# Patient Record
Sex: MALE | Race: WHITE | NOT HISPANIC OR LATINO | Employment: OTHER | URBAN - METROPOLITAN AREA
[De-identification: names, ages, dates, MRNs, and addresses within clinical notes are randomized per-mention and may not be internally consistent; named-entity substitution may affect disease eponyms.]

---

## 2017-08-01 ENCOUNTER — TRANSCRIBE ORDERS (OUTPATIENT)
Dept: ADMINISTRATIVE | Facility: HOSPITAL | Age: 42
End: 2017-08-01

## 2017-08-01 ENCOUNTER — APPOINTMENT (OUTPATIENT)
Dept: LAB | Facility: HOSPITAL | Age: 42
End: 2017-08-01
Payer: MEDICARE

## 2017-08-01 DIAGNOSIS — A69.29 LYME CARDITIS: ICD-10-CM

## 2017-08-01 DIAGNOSIS — A69.29 LYME CARDITIS: Primary | ICD-10-CM

## 2017-08-01 PROCEDURE — 86618 LYME DISEASE ANTIBODY: CPT

## 2017-08-01 PROCEDURE — 36415 COLL VENOUS BLD VENIPUNCTURE: CPT

## 2017-08-02 LAB
B BURGDOR IGG SER IA-ACNC: 0.06
B BURGDOR IGM SER IA-ACNC: 0.21

## 2018-09-26 ENCOUNTER — APPOINTMENT (EMERGENCY)
Dept: ULTRASOUND IMAGING | Facility: HOSPITAL | Age: 43
End: 2018-09-26
Payer: MEDICARE

## 2018-09-26 ENCOUNTER — HOSPITAL ENCOUNTER (EMERGENCY)
Facility: HOSPITAL | Age: 43
Discharge: HOME | End: 2018-09-26
Attending: EMERGENCY MEDICINE
Payer: MEDICARE

## 2018-09-26 VITALS
BODY MASS INDEX: 35 KG/M2 | SYSTOLIC BLOOD PRESSURE: 154 MMHG | TEMPERATURE: 97.9 F | HEART RATE: 86 BPM | DIASTOLIC BLOOD PRESSURE: 84 MMHG | HEIGHT: 71 IN | OXYGEN SATURATION: 98 % | RESPIRATION RATE: 20 BRPM | WEIGHT: 250 LBS

## 2018-09-26 DIAGNOSIS — L03.115 CELLULITIS OF RIGHT LOWER EXTREMITY: Primary | ICD-10-CM

## 2018-09-26 LAB
ALBUMIN SERPL BCP-MCNC: 3.5 G/DL (ref 3.5–5)
ALP SERPL-CCNC: 62 U/L (ref 46–116)
ALT SERPL W P-5'-P-CCNC: 80 U/L (ref 12–78)
ANION GAP SERPL CALCULATED.3IONS-SCNC: 4 MMOL/L (ref 4–13)
APTT PPP: 26 SECONDS (ref 24–36)
AST SERPL W P-5'-P-CCNC: 38 U/L (ref 5–45)
BASOPHILS # BLD AUTO: 0.08 THOUSANDS/ΜL (ref 0–0.1)
BASOPHILS NFR BLD AUTO: 1 % (ref 0–1)
BILIRUB SERPL-MCNC: 0.3 MG/DL (ref 0.2–1)
BUN SERPL-MCNC: 10 MG/DL (ref 5–25)
CALCIUM SERPL-MCNC: 8.7 MG/DL (ref 8.3–10.1)
CHLORIDE SERPL-SCNC: 105 MMOL/L (ref 100–108)
CO2 SERPL-SCNC: 34 MMOL/L (ref 21–32)
CREAT SERPL-MCNC: 1.04 MG/DL (ref 0.6–1.3)
EOSINOPHIL # BLD AUTO: 0.18 THOUSAND/ΜL (ref 0–0.61)
EOSINOPHIL NFR BLD AUTO: 2 % (ref 0–6)
ERYTHROCYTE [DISTWIDTH] IN BLOOD BY AUTOMATED COUNT: 12.8 % (ref 11.6–15.1)
GFR SERPL CREATININE-BSD FRML MDRD: 86 ML/MIN/1.73SQ M
GLUCOSE SERPL-MCNC: 90 MG/DL (ref 65–140)
HCT VFR BLD AUTO: 47.2 % (ref 36.5–49.3)
HGB BLD-MCNC: 16.1 G/DL (ref 12–17)
IMM GRANULOCYTES # BLD AUTO: 0.11 THOUSAND/UL (ref 0–0.2)
IMM GRANULOCYTES NFR BLD AUTO: 1 % (ref 0–2)
INR PPP: 0.9 (ref 0.86–1.17)
LYMPHOCYTES # BLD AUTO: 1.59 THOUSANDS/ΜL (ref 0.6–4.47)
LYMPHOCYTES NFR BLD AUTO: 18 % (ref 14–44)
MCH RBC QN AUTO: 30.8 PG (ref 26.8–34.3)
MCHC RBC AUTO-ENTMCNC: 34.1 G/DL (ref 31.4–37.4)
MCV RBC AUTO: 90 FL (ref 82–98)
MONOCYTES # BLD AUTO: 1.31 THOUSAND/ΜL (ref 0.17–1.22)
MONOCYTES NFR BLD AUTO: 15 % (ref 4–12)
NEUTROPHILS # BLD AUTO: 5.57 THOUSANDS/ΜL (ref 1.85–7.62)
NEUTS SEG NFR BLD AUTO: 63 % (ref 43–75)
NRBC BLD AUTO-RTO: 0 /100 WBCS
PLATELET # BLD AUTO: 247 THOUSANDS/UL (ref 149–390)
PMV BLD AUTO: 10.4 FL (ref 8.9–12.7)
POTASSIUM SERPL-SCNC: 4.3 MMOL/L (ref 3.5–5.3)
PROT SERPL-MCNC: 6.6 G/DL (ref 6.4–8.2)
PROTHROMBIN TIME: 12 SECONDS (ref 11.8–14.2)
RBC # BLD AUTO: 5.22 MILLION/UL (ref 3.88–5.62)
SODIUM SERPL-SCNC: 143 MMOL/L (ref 136–145)
WBC # BLD AUTO: 8.84 THOUSAND/UL (ref 4.31–10.16)

## 2018-09-26 PROCEDURE — 93971 EXTREMITY STUDY: CPT

## 2018-09-26 PROCEDURE — 99284 EMERGENCY DEPT VISIT MOD MDM: CPT

## 2018-09-26 PROCEDURE — 85025 COMPLETE CBC W/AUTO DIFF WBC: CPT | Performed by: PHYSICIAN ASSISTANT

## 2018-09-26 PROCEDURE — 80053 COMPREHEN METABOLIC PANEL: CPT | Performed by: PHYSICIAN ASSISTANT

## 2018-09-26 PROCEDURE — 36415 COLL VENOUS BLD VENIPUNCTURE: CPT | Performed by: PHYSICIAN ASSISTANT

## 2018-09-26 PROCEDURE — 85610 PROTHROMBIN TIME: CPT | Performed by: PHYSICIAN ASSISTANT

## 2018-09-26 PROCEDURE — 96365 THER/PROPH/DIAG IV INF INIT: CPT

## 2018-09-26 PROCEDURE — 93971 EXTREMITY STUDY: CPT | Performed by: SURGERY

## 2018-09-26 PROCEDURE — 85730 THROMBOPLASTIN TIME PARTIAL: CPT | Performed by: PHYSICIAN ASSISTANT

## 2018-09-26 RX ORDER — CLINDAMYCIN PHOSPHATE 600 MG/50ML
600 INJECTION INTRAVENOUS ONCE
Status: COMPLETED | OUTPATIENT
Start: 2018-09-26 | End: 2018-09-26

## 2018-09-26 RX ORDER — CLINDAMYCIN HYDROCHLORIDE 300 MG/1
300 CAPSULE ORAL 3 TIMES DAILY
Qty: 30 CAPSULE | Refills: 0 | Status: SHIPPED | OUTPATIENT
Start: 2018-09-26 | End: 2018-10-06

## 2018-09-26 RX ORDER — CLINDAMYCIN PHOSPHATE 600 MG/50ML
INJECTION INTRAVENOUS
Status: DISCONTINUED
Start: 2018-09-26 | End: 2018-09-26 | Stop reason: HOSPADM

## 2018-09-26 RX ORDER — HYDROCODONE BITARTRATE AND ACETAMINOPHEN 5; 325 MG/1; MG/1
1 TABLET ORAL EVERY 6 HOURS PRN
Qty: 12 TABLET | Refills: 0 | Status: SHIPPED | OUTPATIENT
Start: 2018-09-26 | End: 2018-09-29

## 2018-09-26 RX ADMIN — CLINDAMYCIN PHOSPHATE 600 MG: 600 INJECTION, SOLUTION INTRAVENOUS at 15:25

## 2018-09-26 NOTE — DISCHARGE INSTRUCTIONS
Cellulitis   WHAT YOU NEED TO KNOW:   Cellulitis is a skin infection caused by bacteria  Cellulitis may go away on its own or you may need treatment  Your healthcare provider may draw a [place] around the outside edges of your cellulitis  If your cellulitis spreads, your healthcare provider will see it outside of the [place]  DISCHARGE INSTRUCTIONS:   Call 911 if:   · You have sudden trouble breathing or chest pain  Return to the emergency department if:   · Your wound gets larger and more painful  · You feel a crackling under your skin when you touch it  · You have purple dots or bumps on your skin, or you see bleeding under your skin  · You have new swelling and pain in your legs  · The red, warm, swollen area gets larger  · You see red streaks coming from the infected area  Contact your healthcare provider if:   · You have a fever  · Your fever or pain does not go away or gets worse  · The area does not get smaller after 2 days of antibiotics  · Your skin is flaking or peeling off  · You have questions or concerns about your condition or care  Medicines:   · Antibiotics  help treat the bacterial infection  · NSAIDs , such as ibuprofen, help decrease swelling, pain, and fever  NSAIDs can cause stomach bleeding or kidney problems in certain people  If you take blood thinner medicine, always ask if NSAIDs are safe for you  Always read the medicine label and follow directions  Do not give these medicines to children under 10months of age without direction from your child's healthcare provider  · Acetaminophen  decreases pain and fever  It is available without a doctor's order  Ask how much to take and how often to take it  Follow directions  Read the labels of all other medicines you are using to see if they also contain acetaminophen, or ask your doctor or pharmacist  Acetaminophen can cause liver damage if not taken correctly   Do not use more than 4 grams (4,000 milligrams) total of acetaminophen in one day  · Take your medicine as directed  Contact your healthcare provider if you think your medicine is not helping or if you have side effects  Tell him or her if you are allergic to any medicine  Keep a list of the medicines, vitamins, and herbs you take  Include the amounts, and when and why you take them  Bring the list or the pill bottles to follow-up visits  Carry your medicine list with you in case of an emergency  Self-care:   · Elevate the area above the level of your heart  as often as you can  This will help decrease swelling and pain  Prop the area on pillows or blankets to keep it elevated comfortably  · Clean the area daily until the wound scabs over  Gently wash the area with soap and water  Pat dry  Use dressings as directed  · Place cool or warm, wet cloths on the area as directed  Use clean cloths and clean water  Leave it on the area until the cloth is room temperature  Pat the area dry with a clean, dry cloth  The cloths may help decrease pain  Prevent cellulitis:   · Do not scratch bug bites or areas of injury  You increase your risk for cellulitis by scratching these areas  · Do not share personal items, such as towels, clothing, and razors  · Clean exercise equipment  with germ-killing  before and after you use it  · Wash your hands often  Use soap and water  Wash your hands after you use the bathroom, change a child's diapers, or sneeze  Wash your hands before you prepare or eat food  Use lotion to prevent dry, cracked skin  · Wear pressure stockings as directed  You may be told to wear the stockings if you have peripheral edema  The stockings improve blood flow and decrease swelling  · Treat athlete's foot  This can help prevent the spread of a bacterial skin infection  Follow up with your healthcare provider within 3 days, or as directed:   Your healthcare provider will check if your cellulitis is getting better  You may need different medicine  Write down your questions so you remember to ask them during your visits  © 2017 [employer] Information is for End User's use only and may not be sold, redistributed or otherwise used for commercial purposes  All illustrations and images included in CareNotes® are the copyrighted property of [employer]  or [employer]  The above information is an  only  It is not intended as medical advice for individual conditions or treatments  Talk to your doctor, nurse or pharmacist before following any medical regimen to see if it is safe and effective for you

## 2018-09-26 NOTE — ED PROVIDER NOTES
History  Chief Complaint   Patient presents with    Leg Swelling     Pt presents to ER with c/o's RLE redness & swelling that he noticed today  Pt denies trauma  Healthy appearing adult presents in no distress  24-year-old male with past medical history significant for hypertension presents to the emergency department with chief complaint of right lower extremity redness and swelling  Onset of symptoms reported as today  Location of symptoms reported as right lower leg  Quality is reported as localized redness and swelling  Severity is reported as moderate  Associated symptoms:  Denies fevers  Denies chills  Denies nausea or vomiting  Denies lower extremity paralysis paresthesias or weakness  Positive for redness and erythema  Modifying factors:  Nothing has been tried for treatment of symptoms  Context:  Patient reports no local injury or trauma to the area  He reports today he noticed increasing redness and swelling to his right lower leg  He denies any prior similar episodes in the past   He denies any history of diabetes  He does report that he was on a plane ride for  a few hours down to [place] earlier this week followed by 4 hours of driving  He reports no history of DVT in the past   He reports he is on testosterone replacement for low testosterone levels  He is a nonsmoker  Denies history of MRSA  Medical summary: reviewed past visits via [facility], no prior visits to this ED  History provided by:  Patient   used: No        None       Past Medical History:   Diagnosis Date    ADD (attention deficit disorder)     Chronic pain     Hypertension        History reviewed  No pertinent surgical history  History reviewed  No pertinent family history  I have reviewed and agree with the history as documented      Social History   Substance Use Topics    Smoking status: Not on file    Smokeless tobacco: Not on file    Alcohol use Not on file        Review of Systems   Constitutional: Negative for activity change, appetite change, chills, diaphoresis, fatigue, fever and unexpected weight change  HENT: Negative for congestion, dental problem, drooling, ear discharge, ear pain, facial swelling, hearing loss, mouth sores, nosebleeds, postnasal drip, rhinorrhea, sinus pain, sinus pressure, sneezing, sore throat, tinnitus, trouble swallowing and voice change  Eyes: Negative for photophobia, pain, discharge, redness, itching and visual disturbance  Respiratory: Negative for apnea, cough, choking, chest tightness, shortness of breath, wheezing and stridor  Cardiovascular: Negative for chest pain, palpitations and leg swelling  Gastrointestinal: Negative for abdominal distention, abdominal pain, anal bleeding, blood in stool, constipation, diarrhea, nausea, rectal pain and vomiting  Endocrine: Negative for cold intolerance, heat intolerance, polydipsia, polyphagia and polyuria  Genitourinary: Negative for difficulty urinating, dysuria, flank pain, frequency, hematuria and urgency  Musculoskeletal: Negative for arthralgias, back pain, gait problem, joint swelling, myalgias, neck pain and neck stiffness  Skin: Positive for rash  Negative for pallor and wound  Allergic/Immunologic: Negative for environmental allergies, food allergies and immunocompromised state  Neurological: Negative for dizziness, tremors, seizures, syncope, facial asymmetry, speech difficulty, weakness, light-headedness, numbness and headaches  Hematological: Negative for adenopathy  Does not bruise/bleed easily  Psychiatric/Behavioral: Negative for agitation, confusion and hallucinations  The patient is not nervous/anxious  All other systems reviewed and are negative  Physical Exam  Physical Exam   Constitutional: He is oriented to person, place, and time  He appears well-developed and well-nourished  No distress     /84 (BP Location: Right arm)   Pulse 86   Temp 97 9 °F (36 6 °C) (Oral)   Resp 20   Ht 5' 11" (1 803 m)   Wt 113 kg (250 lb)   SpO2 98%   BMI 34 87 kg/m²    HENT:   Head: Normocephalic and atraumatic  Right Ear: External ear normal    Left Ear: External ear normal    Nose: Nose normal    Mouth/Throat: Oropharynx is clear and moist  No oropharyngeal exudate  Eyes: Conjunctivae and EOM are normal  Pupils are equal, round, and reactive to light  Right eye exhibits no discharge  Left eye exhibits no discharge  No scleral icterus  Neck: Normal range of motion  Neck supple  No tracheal deviation present  No thyromegaly present  Cardiovascular: Normal rate, regular rhythm and intact distal pulses  Pulmonary/Chest: Effort normal and breath sounds normal  No stridor  No respiratory distress  He has no wheezes  He has no rales  He exhibits no tenderness  Abdominal: Soft  Bowel sounds are normal  He exhibits no distension and no mass  There is no tenderness  There is no rebound and no guarding  Musculoskeletal: Normal range of motion  He exhibits edema and tenderness  He exhibits no deformity  Right knee: He exhibits erythema  He exhibits normal range of motion, no effusion, no ecchymosis, no deformity, no laceration, normal alignment, no LCL laxity, normal patellar mobility, no bony tenderness and no MCL laxity  Legs:  Lymphadenopathy:     He has no cervical adenopathy  Neurological: He is alert and oriented to person, place, and time  He displays normal reflexes  No cranial nerve deficit or sensory deficit  He exhibits normal muscle tone  Coordination normal    Skin: Skin is warm and dry  Capillary refill takes less than 2 seconds  No rash noted  He is not diaphoretic  There is erythema  No pallor  There is a roughly circular shaped area 5 cm diameter of flat uniform erythema present to anterior right lower leg  No definable abrasion/laceration/skin breakdown  This area is tender to palpation       Psychiatric: He has a normal mood and affect  His behavior is normal  Judgment and thought content normal    Nursing note and vitals reviewed  Vital Signs  ED Triage Vitals [09/26/18 1230]   Temperature Pulse Respirations Blood Pressure SpO2   97 9 °F (36 6 °C) 86 20 154/84 98 %      Temp Source Heart Rate Source Patient Position - Orthostatic VS BP Location FiO2 (%)   Oral Monitor Sitting Right arm --      Pain Score       7           Vitals:    09/26/18 1230   BP: 154/84   Pulse: 86   Patient Position - Orthostatic VS: Sitting       Visual Acuity      ED Medications  Medications   clindamycin (CLEOCIN) IVPB (premix) 600 mg (0 mg Intravenous Stopped 9/26/18 1605)       Diagnostic Studies  Results Reviewed     Procedure Component Value Units Date/Time    Protime-INR [[id number]]  (Normal) Collected:  09/26/18 1326    Lab Status:  Final result Specimen:  Blood from Arm, Right Updated:  09/26/18 1420     Protime 12 0 seconds      INR 0 90    APTT [[id number]]  (Normal) Collected:  09/26/18 1326    Lab Status:  Final result Specimen:  Blood from Arm, Right Updated:  09/26/18 1420     PTT 26 seconds     Comprehensive metabolic panel [[id number]]  (Abnormal) Collected:  09/26/18 1326    Lab Status:  Final result Specimen:  Blood from Arm, Right Updated:  09/26/18 1354     Sodium 143 mmol/L      Potassium 4 3 mmol/L      Chloride 105 mmol/L      CO2 34 (H) mmol/L      ANION GAP 4 mmol/L      BUN 10 mg/dL      Creatinine 1 04 mg/dL      Glucose 90 mg/dL      Calcium 8 7 mg/dL      AST 38 U/L      ALT 80 (H) U/L      Alkaline Phosphatase 62 U/L      Total Protein 6 6 g/dL      Albumin 3 5 g/dL      Total Bilirubin 0 30 mg/dL      eGFR 86 ml/min/1 73sq m     Narrative:         National Kidney Disease Education Program recommendations are as follows:  GFR calculation is accurate only with a steady state creatinine  Chronic Kidney disease less than 60 ml/min/1 73 sq  meters  Kidney failure less than 15 ml/min/1 73 sq  meters      CBC and differential [[id number]] (Abnormal) Collected:  09/26/18 1326    Lab Status:  Final result Specimen:  Blood from Arm, Right Updated:  09/26/18 1332     WBC 8 84 Thousand/uL      RBC 5 22 Million/uL      Hemoglobin 16 1 g/dL      Hematocrit 47 2 %      MCV 90 fL      MCH 30 8 pg      MCHC 34 1 g/dL      RDW 12 8 %      MPV 10 4 fL      Platelets 159 Thousands/uL      nRBC 0 /100 WBCs      Neutrophils Relative 63 %      Immat GRANS % 1 %      Lymphocytes Relative 18 %      Monocytes Relative 15 (H) %      Eosinophils Relative 2 %      Basophils Relative 1 %      Neutrophils Absolute 5 57 Thousands/µL      Immature Grans Absolute 0 11 Thousand/uL      Lymphocytes Absolute 1 59 Thousands/µL      Monocytes Absolute 1 31 (H) Thousand/µL      Eosinophils Absolute 0 18 Thousand/µL      Basophils Absolute 0 08 Thousands/µL                  VAS lower limb venous duplex study, unilateral/limited   Final Result by Shakila Vargas MD (09/26 3078)                 Procedures  Procedures       Phone Contacts  ED Phone Contact    ED Course                               MDM  Number of Diagnoses or Management Options  Cellulitis of right lower extremity: new and requires workup  Diagnosis management comments: ddx includes but is not limited to insect bite, allergic reaction, gout, pseudogout, phytodermatitis, erythema nodosum, DVT, superficial thrombophlebitis, contusion, abrasion, angioedema, PVD, PAD, lymphangitis, consider but doubt necrotizing fasciitis    Lab results reviewed  CBC demonstrates normal white blood cell count of 8 8  Hemoglobin of 16 1 and hematocrit 47 2 are normal   No anemia  INR is normal at 1 09  No coagulopathy  Comprehensive metabolic panel reviewed remarkable for CO2 elevated at 34 and ALT mildly elevated at 80 otherwise normal   Ultrasound of the right lower extremity reviewed:RIGHT LOWER LIMB  No evidence of acute or chronic deep vein thrombosis   No evidence of superficial thrombophlebitis noted    Doppler evaluation shows a normal response to augmentation maneuvers  Popliteal, posterior tibial and peroneal arterial Doppler waveforms are  triphasic      LEFT LOWER LIMB LIMITED  Evaluation shows no evidence of thrombus in the common femoral vein  Doppler evaluation shows a normal response to augmentation maneuvers  I reviewed all test results with the patient at bedside  Discussed diagnosis of cellulitis  Will start patient on clindamycin for antibiotic coverage  Given stable vital signs, normal white blood cell count and negative ultrasound for DVT will attempt outpatient treatment for right lower extremity cellulitis  Patient with no history of MRSA  Discussed plan of for treatment with patient discussed outpatient follow up with primary care physician in 3-5 days for further evaluation of symptoms  Reviewed reasons to return to ed  Patient verbalized understanding of diagnosis and agreement with discharge plan of care as well as understanding of reasons to return to ed  Standard narcotic precautions given                 Amount and/or Complexity of Data Reviewed  Clinical lab tests: ordered and reviewed  Tests in the radiology section of CPT®: ordered and reviewed  Discussion of test results with the performing providers: yes  Obtain history from someone other than the patient: yes (Significant other at bedside)  Review and summarize past medical records: yes  Independent visualization of images, tracings, or specimens: yes    Patient Progress  Patient progress: stable    CritCare Time    Disposition  Final diagnoses:   Cellulitis of right lower extremity     Time reflects when diagnosis was documented in both MDM as applicable and the Disposition within this note     Time User Action Codes Description Comment    9/26/2018  3:18 PM Flora Valentine Add [[id number]] Cellulitis of right lower extremity       ED Disposition     ED Disposition Condition Comment    Discharge  Irasema Cano discharge to home/self care     Condition at discharge: Stable        Follow-up Information     Follow up With Specialties Details Why Contact Info Additional [address], [place] Internal Medicine Call in 1 day for further evaluation of symptoms [facility] Emergency Department Emergency Medicine Go to If symptoms worsen [address] [facility] [id number]  [address] [place] [address] ED, [address] [place], [place], [address]          Patient's Medications   Discharge Prescriptions    CLINDAMYCIN (CLEOCIN) 300 MG CAPSULE    Take 1 capsule (300 mg total) by mouth 3 (three) times a day for 10 days       Start Date: 9/26/2018 End Date: 10/6/2018       Order Dose: 300 mg       Quantity: 30 capsule    Refills: 0    HYDROCODONE-ACETAMINOPHEN (NORCO) 5-325 MG PER TABLET    Take 1 tablet by mouth every 6 (six) hours as needed for pain (leg pain/initial rx ) for up to 3 days Max Daily Amount: 4 tablets       Start Date: 9/26/2018 End Date: 9/29/2018       Order Dose: 1 tablet       Quantity: 12 tablet    Refills: 0     No discharge procedures on file      ED Provider  Electronically Signed by           Jonathan Callaway PA-C  09/26/18 3804

## 2024-03-29 ENCOUNTER — TELEPHONE (OUTPATIENT)
Age: 49
End: 2024-03-29

## 2025-08-04 ENCOUNTER — TELEPHONE (OUTPATIENT)
Age: 50
End: 2025-08-04